# Patient Record
Sex: FEMALE | Race: WHITE | Employment: FULL TIME | URBAN - METROPOLITAN AREA
[De-identification: names, ages, dates, MRNs, and addresses within clinical notes are randomized per-mention and may not be internally consistent; named-entity substitution may affect disease eponyms.]

---

## 2017-04-12 ENCOUNTER — OFFICE VISIT (OUTPATIENT)
Dept: NEUROLOGY | Age: 27
End: 2017-04-12

## 2017-04-12 VITALS
OXYGEN SATURATION: 99 % | WEIGHT: 152.8 LBS | SYSTOLIC BLOOD PRESSURE: 104 MMHG | HEART RATE: 84 BPM | DIASTOLIC BLOOD PRESSURE: 70 MMHG | BODY MASS INDEX: 27.07 KG/M2 | HEIGHT: 63 IN

## 2017-04-12 DIAGNOSIS — R42 DIZZINESS: Primary | ICD-10-CM

## 2017-04-12 NOTE — PROGRESS NOTES
NEUROLOGY NEW PATIENT CONSULATION  REFERRED BY:  Abdiaziz Barry MD    04/12/17    Chief Complaint   Patient presents with    New Patient     Headaches, dizziness, lighted head for a month       HISTORY OF PRESENT ILLNESS  Ada Rajan is a 27 y.o. female who presented to the neurology office for management of dizziness. She states that the dizziness started around a month ago which was sudden onset and states that she does at least have an episode of dizziness. By dizziness she means a room spinning sensation and it can last for a few minutes to a few hours. It is worsened with increased stress and movement. She does also notice it when she bends down and stands up. She denies any nausea, vomiting, photophobia or phonophobia. Sometimes she does have headaches associated with that and does have problems focusing. The headaches and in the left temporal and occipital area and can last for a few minutes. Risk Factors for headaches  Smoking: Denies  Coffee: 1 cups/day  Tea: 0 cups/day  Soda: 0 cans/day  Water: 2 glasses/day  Sleeps at 6-8 hours and does not snore. Medications tried  Meclizine      Current Outpatient Prescriptions   Medication Sig    RANITIDINE HCL (ZANTAC PO) Take  by mouth.  NORETHINDRONE ACETATE PO Take  by mouth.  IRON,CARB/VIT C/VIT B12/FOLIC (IRON 743 PLUS PO) Take  by mouth.  MULTIVITAMIN PO Take  by mouth.  ACETAMINOPHEN (TYLENOL 8 HOUR PO) Take  by mouth.  omeprazole (PRILOSEC) 20 mg capsule Take 1 Cap by mouth daily. No current facility-administered medications for this visit. Allergies   Allergen Reactions    Pcn [Penicillins] Rash    Sulfa (Sulfonamide Antibiotics) Rash     History reviewed. No pertinent past medical history. History reviewed. No pertinent surgical history.   Family History   Problem Relation Age of Onset    Headache Mother     Cancer Maternal Grandmother      Social History   Substance Use Topics    Smoking status: Never Smoker    Smokeless tobacco: Never Used    Alcohol use No       REVIEW OF SYSTEMS:   A ten system review of constitutional, cardiovascular, respiratory, musculoskeletal, endocrine, skin, SHEENT, genitourinary, psychiatric and neurologic systems was obtained and is unremarkable with the exception of the following: Frequent headaches     EXAMINATION:   Visit Vitals    /70    Pulse 84    Ht 5' 3\" (1.6 m)    Wt 152 lb 12.8 oz (69.3 kg)    LMP 04/10/2017    SpO2 99%    BMI 27.07 kg/m2        General:   General appearance: Pt is in no acute distress   Distal pulses are preserved  Fundoscopic Exam: Normal    Neurological Examination:   Mental Status: AAO x3. Speech is fluent. Follows commands, has normal fund of knowledge, attention, short term recall, comprehension and insight. Cranial Nerves: Visual fields are full. PERRL, Extraocular movements are full. Facial sensation intact V1- V3. Facial movement intact, symmetric. Hearing intact to conversation. Palate elevates symmetrically. Shoulder shrug symmetric. Tongue midline. Motor: Strength is 5/5 in all 4 ext. No atrophy. Tone: Normal    Sensation: Normal to light touch    Reflexes: DTRs 2+ throughout. Plantar responses downgoing. Coordination/Cerebellar: Intact to finger-nose-finger     Gait: Romberg is negative and casual gait is normal.     Skin: No significant bruising or lacerations. Laboratory review:   Results for orders placed or performed in visit on 01/06/14   AMB POC RAPID STREP A   Result Value Ref Range    VALID INTERNAL CONTROL POC Yes     Group A Strep Ag Negative Negative       Imaging review:  None    Documentation review:  None    Assessment/Plan:   Jacqueline Haq is a 27 y.o. female who presented to the neurology office for management of intermittent dizziness and headaches associated with that. Examination is normal.  Her mom has multiple sclerosis.   I will be getting an MRI of the brain and cervical spine to check for multiple sclerosis and also for other intracranial lesions leading to headaches. I am also going to send her for vestibular rehabilitation for her dizziness. If the workup is negative, there is also possibility of vestibular migraines. Follow-up in 6-8 weeks      ICD-10-CM ICD-9-CM    1. Dizziness R42 780.4 MRI BRAIN WO CONT      MRI CERV SPINE WO CONT      REFERRAL TO PHYSICAL THERAPY      Thank you for allowing me to participate in the care of Ms. Jennifer Lindsay. Please feel free to contact me if you have any questions. Lilia Blackwell MD  Diplomate, American Board of Psychiatry & Neurology (Neurology)  [employer] Board of Psychiatry & Neurology (Clinical Neurophysiology)    CC: Benita Addison MD  Fax: [phone]    This note was created using voice recognition software. Despite editing, there may be syntax errors. This note will not be viewable in [address]

## 2017-04-12 NOTE — LETTER
4/12/2017 10:49 AM 
 
Patient:    Paco Urbano YOB: 1990 Date of Visit:    4/12/2017 Dear Kate Forbes MD 
 
Thank you for referring Ms. Pradeep Goodwin to me for evaluation/treatment. Below are the relevant portions of my assessment and plan of care. NEUROLOGY NEW PATIENT CONSULATION 
REFERRED BY: 
Kate Forbes MD 
 
04/12/17 Chief Complaint Patient presents with  New Patient Headaches, dizziness, lighted head for a month HISTORY OF PRESENT ILLNESS Paco Urbano is a 27 y.o. female who presented to the neurology office for management of dizziness. She states that the dizziness started around a month ago which was sudden onset and states that she does at least have an episode of dizziness. By dizziness she means a room spinning sensation and it can last for a few minutes to a few hours. It is worsened with increased stress and movement. She does also notice it when she bends down and stands up. She denies any nausea, vomiting, photophobia or phonophobia. Sometimes she does have headaches associated with that and does have problems focusing. The headaches and in the left temporal and occipital area and can last for a few minutes. Risk Factors for headaches Smoking: Denies Coffee: 1 cups/day Tea: 0 cups/day Soda: 0 cans/day Water: 2 glasses/day Sleeps at 6-8 hours and does not snore. Medications tried Meclizine Current Outpatient Prescriptions Medication Sig  
 RANITIDINE HCL (ZANTAC PO) Take  by mouth.  NORETHINDRONE ACETATE PO Take  by mouth.  IRON,CARB/VIT C/VIT B12/FOLIC (IRON 840 PLUS PO) Take  by mouth.  MULTIVITAMIN PO Take  by mouth.  ACETAMINOPHEN (TYLENOL 8 HOUR PO) Take  by mouth.  omeprazole (PRILOSEC) 20 mg capsule Take 1 Cap by mouth daily. No current facility-administered medications for this visit. Allergies Allergen Reactions  Pcn [Penicillins] Rash  Sulfa (Sulfonamide Antibiotics) Rash History reviewed. No pertinent past medical history. History reviewed. No pertinent surgical history. Family History Problem Relation Age of Onset  Headache Mother  Cancer Maternal Grandmother Social History Substance Use Topics  Smoking status: Never Smoker  Smokeless tobacco: Never Used  Alcohol use No  
 
 
REVIEW OF SYSTEMS:  
A ten system review of constitutional, cardiovascular, respiratory, musculoskeletal, endocrine, skin, SHEENT, genitourinary, psychiatric and neurologic systems was obtained and is unremarkable with the exception of the following: Frequent headaches EXAMINATION:  
Visit Vitals  /70  Pulse 84  
 Ht 5' 3\" (1.6 m)  Wt 152 lb 12.8 oz (69.3 kg)  LMP 04/10/2017  SpO2 99%  BMI 27.07 kg/m2 General:  
General appearance: Pt is in no acute distress Distal pulses are preserved Fundoscopic Exam: Normal 
 
Neurological Examination:  
Mental Status: AAO x3. Speech is fluent. Follows commands, has normal fund of knowledge, attention, short term recall, comprehension and insight. Cranial Nerves: Visual fields are full. PERRL, Extraocular movements are full. Facial sensation intact V1- V3. Facial movement intact, symmetric. Hearing intact to conversation. Palate elevates symmetrically. Shoulder shrug symmetric. Tongue midline. Motor: Strength is 5/5 in all 4 ext. No atrophy. Tone: Normal 
 
Sensation: Normal to light touch Reflexes: DTRs 2+ throughout. Plantar responses downgoing. Coordination/Cerebellar: Intact to finger-nose-finger Gait: Romberg is negative and casual gait is normal.  
 
Skin: No significant bruising or lacerations. Laboratory review:  
Results for orders placed or performed in visit on 01/06/14 AMB POC RAPID STREP A Result Value Ref Range VALID INTERNAL CONTROL POC Yes Group A Strep Ag Negative Negative Imaging review: 
None Documentation review: 
None Assessment/Plan:  
Asad Ortega is a 27 y.o. female who presented to the neurology office for management of intermittent dizziness and headaches associated with that. Examination is normal.  Her mom has multiple sclerosis. I will be getting an MRI of the brain and cervical spine to check for multiple sclerosis and also for other intracranial lesions leading to headaches. I am also going to send her for vestibular rehabilitation for her dizziness. If the workup is negative, there is also possibility of vestibular migraines. Follow-up in 6-8 weeks ICD-10-CM ICD-9-CM 1. Dizziness R42 780.4 MRI BRAIN WO CONT  
   MRI CERV SPINE WO CONT  
   REFERRAL TO PHYSICAL THERAPY Thank you for allowing me to participate in the care of Ms. Diana Bell. Please feel free to contact me if you have any questions. Marine Arauz MD 
Diplomate, American Board of Psychiatry & Neurology (Neurology) Lou Ojeda Board of Psychiatry & Neurology (Clinical Neurophysiology) CC: Trevon Carroll MD 
Fax: [phone] This note was created using voice recognition software. Despite editing, there may be syntax errors. This note will not be viewable in [address] If you have questions, please do not hesitate to call me. I look forward to following Ms. Diana Bell along with you. Sincerely, Marine Arauz MD

## 2017-04-12 NOTE — PATIENT INSTRUCTIONS
1.  Vestibular rehabilitation  2. MRI of the brain and cervical spine  3. Follow-up in 6-8 weeks      A Healthy Lifestyle: Care Instructions  Your Care Instructions  A healthy lifestyle can help you feel good, stay at a healthy weight, and have plenty of energy for both work and play. A healthy lifestyle is something you can share with your whole family. A healthy lifestyle also can lower your risk for serious health problems, such as high blood pressure, heart disease, and diabetes. You can follow a few steps listed below to improve your health and the health of your family. Follow-up care is a key part of your treatment and safety. Be sure to make and go to all appointments, and call your doctor if you are having problems. Its also a good idea to know your test results and keep a list of the medicines you take. How can you care for yourself at home? · Do not eat too much sugar, fat, or fast foods. You can still have dessert and treats now and then. The goal is moderation. · Start small to improve your eating habits. Pay attention to portion sizes, drink less juice and soda pop, and eat more fruits and vegetables. ¨ Eat a healthy amount of food. A 3-ounce serving of meat, for example, is about the size of a deck of cards. Fill the rest of your plate with vegetables and whole grains. ¨ Limit the amount of soda and sports drinks you have every day. Drink more water when you are thirsty. ¨ Eat at least 5 servings of fruits and vegetables every day. It may seem like a lot, but it is not hard to reach this goal. A serving or helping is 1 piece of fruit, 1 cup of vegetables, or 2 cups of leafy, raw vegetables. Have an apple or some carrot sticks as an afternoon snack instead of a candy bar. Try to have fruits and/or vegetables at every meal.  · Make exercise part of your daily routine. You may want to start with simple activities, such as walking, bicycling, or slow swimming.  Try to be active 30 to 60 minutes every day. You do not need to do all 30 to 60 minutes all at once. For example, you can exercise 3 times a day for 10 or 20 minutes. Moderate exercise is safe for most people, but it is always a good idea to talk to your doctor before starting an exercise program.  · Keep moving. [place] the lawn, work in the garden, or [place]. Take the stairs instead of the elevator at work. · If you smoke, quit. People who smoke have an increased risk for heart attack, stroke, cancer, and other lung illnesses. Quitting is hard, but there are ways to boost your chance of quitting tobacco for good. ¨ Use nicotine gum, patches, or lozenges. ¨ Ask your doctor about stop-smoking programs and medicines. ¨ Keep trying. In addition to reducing your risk of diseases in the future, you will notice some benefits soon after you stop using tobacco. If you have shortness of breath or asthma symptoms, they will likely get better within a few weeks after you quit. · Limit how much alcohol you drink. Moderate amounts of alcohol (up to 2 drinks a day for men, 1 drink a day for women) are okay. But drinking too much can lead to liver problems, high blood pressure, and other health problems. Family health  If you have a family, there are many things you can do together to improve your health. · Eat meals together as a family as often as possible. · Eat healthy foods. This includes fruits, vegetables, lean meats and dairy, and whole grains. · Include your family in your fitness plan. Most people think of activities such as jogging or tennis as the way to fitness, but there are many ways you and your family can be more active. Anything that makes you breathe hard and gets your heart pumping is exercise. Here are some tips:  ¨ Walk to do errands or to take your child to school or the bus. ¨ Go for a family bike ride after dinner instead of watching TV. Where can you learn more?   Go to http://hamlet-nina.info/. Enter J435 in the search box to learn more about \"A Healthy Lifestyle: Care Instructions. \"  Current as of: July 26, 2016  Content Version: 11.2  © 5480-4387 [employer], [employer]. Care instructions adapted under license by [employer] (which disclaims liability or warranty for this information). If you have questions about a medical condition or this instruction, always ask your healthcare professional. [address] any warranty or liability for your use of this information.

## 2017-04-12 NOTE — MR AVS SNAPSHOT
Visit Information Date & Time Provider Department Dept. Phone Encounter #  
 4/12/2017 10:00 AM Marisela Alexander MD [facility] [phone] [id number] Upcoming Health Maintenance Date Due  
 HPV AGE 9Y-34Y (1 of 3 - Female 3 Dose Series) 10/15/2003 DTaP/Tdap/Td series (1 - Tdap) 10/15/2013 PAP AKA CERVICAL CYTOLOGY 10/15/2013 INFLUENZA AGE 9 TO ADULT 8/1/2016 Allergies as of 4/12/2017  Review Complete On: 4/12/2017 By: Marisela Alexander MD  
  
 Severity Noted Reaction Type Reactions Pcn [Penicillins]  11/03/2010    Rash  
 Sulfa (Sulfonamide Antibiotics)  11/03/2010    Rash Current Immunizations  Never Reviewed No immunizations on file. Not reviewed this visit You Were Diagnosed With   
  
 Codes Comments Dizziness    -  Primary ICD-10-CM: J01 ICD-9-CM: 780.4 Vitals BP Pulse Height(growth percentile) Weight(growth percentile) LMP SpO2  
 104/70 84 5' 3\" (1.6 m) 152 lb 12.8 oz (69.3 kg) 04/10/2017 99% BMI OB Status Smoking Status 27.07 kg/m2 Having regular periods Never Smoker BMI and BSA Data Body Mass Index Body Surface Area  
 27.07 kg/m 2 1.76 m 2 Preferred Pharmacy Pharmacy Name Phone [facility] [address], [address] [place] [address]. [phone] Your Updated Medication List  
  
   
This list is accurate as of: 4/12/17 10:31 AM.  Always use your most recent med list.  
  
  
  
  
 IRON 100 PLUS PO Take  by mouth. MULTIVITAMIN PO Take  by mouth. NORETHINDRONE ACETATE PO Take  by mouth. omeprazole 20 mg capsule Commonly known as:  PriLOSEC Take 1 Cap by mouth daily. TYLENOL 8 HOUR PO Take  by mouth. ZANTAC PO Take  by mouth. We Performed the Following REFERRAL TO PHYSICAL THERAPY [RMB03 Custom] Comments:  
 Vestibular rehab To-Do List   
 04/12/2017 Imaging:  MRI BRAIN WO CONT   
  
 04/12/2017 Imaging:  MRI CERV SPINE WO CONT Patient Instructions 1. Vestibular rehabilitation 2. MRI of the brain and cervical spine 3. Follow-up in 6-8 weeks A Healthy Lifestyle: Care Instructions Your Care Instructions A healthy lifestyle can help you feel good, stay at a healthy weight, and have plenty of energy for both work and play. A healthy lifestyle is something you can share with your whole family. A healthy lifestyle also can lower your risk for serious health problems, such as high blood pressure, heart disease, and diabetes. You can follow a few steps listed below to improve your health and the health of your family. Follow-up care is a key part of your treatment and safety. Be sure to make and go to all appointments, and call your doctor if you are having problems. Its also a good idea to know your test results and keep a list of the medicines you take. How can you care for yourself at home? · Do not eat too much sugar, fat, or fast foods. You can still have dessert and treats now and then. The goal is moderation. · Start small to improve your eating habits. Pay attention to portion sizes, drink less juice and soda pop, and eat more fruits and vegetables. ¨ Eat a healthy amount of food. A 3-ounce serving of meat, for example, is about the size of a deck of cards. Fill the rest of your plate with vegetables and whole grains. ¨ Limit the amount of soda and sports drinks you have every day. Drink more water when you are thirsty. ¨ Eat at least 5 servings of fruits and vegetables every day. It may seem like a lot, but it is not hard to reach this goal. A serving or helping is 1 piece of fruit, 1 cup of vegetables, or 2 cups of leafy, raw vegetables. Have an apple or some carrot sticks as an afternoon snack instead of a candy bar.  Try to have fruits and/or vegetables at every meal. 
 · Make exercise part of your daily routine. You may want to start with simple activities, such as walking, bicycling, or slow swimming. Try to be active 30 to 60 minutes every day. You do not need to do all 30 to 60 minutes all at once. For example, you can exercise 3 times a day for 10 or 20 minutes. Moderate exercise is safe for most people, but it is always a good idea to talk to your doctor before starting an exercise program. 
· Keep moving. [place]imar Canes the lawn, work in the garden, or [place]. Take the stairs instead of the elevator at work. · If you smoke, quit. People who smoke have an increased risk for heart attack, stroke, cancer, and other lung illnesses. Quitting is hard, but there are ways to boost your chance of quitting tobacco for good. ¨ Use nicotine gum, patches, or lozenges. ¨ Ask your doctor about stop-smoking programs and medicines. ¨ Keep trying. In addition to reducing your risk of diseases in the future, you will notice some benefits soon after you stop using tobacco. If you have shortness of breath or asthma symptoms, they will likely get better within a few weeks after you quit. · Limit how much alcohol you drink. Moderate amounts of alcohol (up to 2 drinks a day for men, 1 drink a day for women) are okay. But drinking too much can lead to liver problems, high blood pressure, and other health problems. Family health If you have a family, there are many things you can do together to improve your health. · Eat meals together as a family as often as possible. · Eat healthy foods. This includes fruits, vegetables, lean meats and dairy, and whole grains. · Include your family in your fitness plan. Most people think of activities such as jogging or tennis as the way to fitness, but there are many ways you and your family can be more active. Anything that makes you breathe hard and gets your heart pumping is exercise. Here are some tips: ¨ Walk to do errands or to take your child to school or the bus. ¨ Go for a family bike ride after dinner instead of watching TV. Where can you learn more? Go to http://hamlet-nina.info/. Enter U914 in the search box to learn more about \"A Healthy Lifestyle: Care Instructions. \" Current as of: July 26, 2016 Content Version: 11.2 © 0560-3326 [employer]. Care instructions adapted under license by [employer] (which disclaims liability or warranty for this information). If you have questions about a medical condition or this instruction, always ask your healthcare professional. [place] [address] any warranty or liability for your use of this information. Introducing [facility] & HEALTH SERVICES! [employer] introduces CitySlicker patient portal. Now you can access parts of your medical record, email your doctor's office, and request medication refills online. 1. In your internet browser, go to [email or web address] 2. Click on the First Time User? Click Here link in the Sign In box. You will see the New Member Sign Up page. 3. Enter your CitySlicker Access Code exactly as it appears below. You will not need to use this code after youve completed the sign-up process. If you do not sign up before the expiration date, you must request a new code. · CitySlicker Access Code: [email or web address] Expires: 7/11/2017  9:42 AM 
 
4. Enter the last four digits of your Social Security Number (xxxx) and Date of Birth (mm/dd/yyyy) as indicated and click Submit. You will be taken to the next sign-up page. 5. Create a CitySlicker ID. This will be your CitySlicker login ID and cannot be changed, so think of one that is secure and easy to remember. 6. Create a CitySlicker password. You can change your password at any time. 7. Enter your Password Reset Question and Answer. This can be used at a later time if you forget your password. 8. Enter your e-mail address. You will receive e-mail notification when new information is available in [address] 9. Click Sign Up. You can now view and download portions of your medical record. 10. Click the Download Summary menu link to download a portable copy of your medical information. If you have questions, please visit the Frequently Asked Questions section of the [employer] website. Remember, [employer] is NOT to be used for urgent needs. For medical emergencies, dial 911. Now available from your iPhone and Android! Please provide this summary of care documentation to your next provider. Your primary care clinician is listed as Remy Limon. If you have any questions after today's visit, please call [phone].

## 2017-04-17 DIAGNOSIS — R42 DIZZINESS: Primary | ICD-10-CM

## 2017-04-17 RX ORDER — PROMETHAZINE HYDROCHLORIDE 12.5 MG/1
12.5 TABLET ORAL
Qty: 120 TAB | Refills: 0 | Status: SHIPPED | OUTPATIENT
Start: 2017-04-17

## 2017-04-18 ENCOUNTER — DOCUMENTATION ONLY (OUTPATIENT)
Dept: NEUROLOGY | Age: 27
End: 2017-04-18